# Patient Record
Sex: FEMALE | Race: AMERICAN INDIAN OR ALASKA NATIVE
[De-identification: names, ages, dates, MRNs, and addresses within clinical notes are randomized per-mention and may not be internally consistent; named-entity substitution may affect disease eponyms.]

---

## 2017-02-28 ENCOUNTER — HOSPITAL ENCOUNTER (EMERGENCY)
Dept: HOSPITAL 5 - ED | Age: 47
LOS: 1 days | Discharge: HOME | End: 2017-03-01
Payer: COMMERCIAL

## 2017-02-28 DIAGNOSIS — M54.5: Primary | ICD-10-CM

## 2017-02-28 DIAGNOSIS — Z90.49: ICD-10-CM

## 2017-02-28 DIAGNOSIS — G89.29: ICD-10-CM

## 2017-02-28 DIAGNOSIS — Z90.710: ICD-10-CM

## 2017-02-28 DIAGNOSIS — D64.9: ICD-10-CM

## 2017-02-28 DIAGNOSIS — Z88.8: ICD-10-CM

## 2017-02-28 PROCEDURE — 85025 COMPLETE CBC W/AUTO DIFF WBC: CPT

## 2017-02-28 PROCEDURE — 81001 URINALYSIS AUTO W/SCOPE: CPT

## 2017-02-28 PROCEDURE — 74177 CT ABD & PELVIS W/CONTRAST: CPT

## 2017-02-28 PROCEDURE — 99284 EMERGENCY DEPT VISIT MOD MDM: CPT

## 2017-02-28 PROCEDURE — 83690 ASSAY OF LIPASE: CPT

## 2017-02-28 PROCEDURE — 36415 COLL VENOUS BLD VENIPUNCTURE: CPT

## 2017-02-28 PROCEDURE — 96374 THER/PROPH/DIAG INJ IV PUSH: CPT

## 2017-02-28 PROCEDURE — 80053 COMPREHEN METABOLIC PANEL: CPT

## 2017-03-01 VITALS — SYSTOLIC BLOOD PRESSURE: 101 MMHG | DIASTOLIC BLOOD PRESSURE: 70 MMHG

## 2017-03-01 LAB
ALBUMIN SERPL-MCNC: 4.2 G/DL (ref 3.9–5)
ALBUMIN/GLOB SERPL: 1.2 %
ALP SERPL-CCNC: 91 UNITS/L (ref 35–129)
ALT SERPL-CCNC: 9 UNITS/L (ref 7–56)
ANION GAP SERPL CALC-SCNC: 19 MMOL/L
BACTERIA #/AREA URNS HPF: (no result) /HPF
BASOPHILS NFR BLD AUTO: 0.9 % (ref 0–1.8)
BILIRUB SERPL-MCNC: < 0.2 MG/DL (ref 0.1–1.2)
BILIRUB UR QL STRIP: (no result)
BLOOD UR QL VISUAL: (no result)
BUN SERPL-MCNC: 25 MG/DL (ref 7–17)
BUN/CREAT SERPL: 31.25 %
CALCIUM SERPL-MCNC: 9 MG/DL (ref 8.4–10.2)
CHLORIDE SERPL-SCNC: 98.6 MMOL/L (ref 98–107)
CO2 SERPL-SCNC: 24 MMOL/L (ref 22–30)
EOSINOPHIL NFR BLD AUTO: 2 % (ref 0–4.3)
GLUCOSE SERPL-MCNC: 96 MG/DL (ref 65–100)
HCT VFR BLD CALC: 40.9 % (ref 30.3–42.9)
HGB BLD-MCNC: 13.2 GM/DL (ref 10.1–14.3)
KETONES UR STRIP-MCNC: (no result) MG/DL
LEUKOCYTE ESTERASE UR QL STRIP: (no result)
LIPASE SERPL-CCNC: 33 UNITS/L (ref 13–60)
MCH RBC QN AUTO: 26 PG (ref 28–32)
MCHC RBC AUTO-ENTMCNC: 32 % (ref 30–34)
MCV RBC AUTO: 81 FL (ref 79–97)
MUCOUS THREADS #/AREA URNS HPF: (no result) /HPF
NITRITE UR QL STRIP: (no result)
PH UR STRIP: 5 [PH] (ref 5–7)
PLATELET # BLD: 321 K/MM3 (ref 140–440)
POTASSIUM SERPL-SCNC: 4.2 MMOL/L (ref 3.6–5)
PROT SERPL-MCNC: 7.7 G/DL (ref 6.3–8.2)
PROT UR STRIP-MCNC: (no result) MG/DL
RBC # BLD AUTO: 5.08 M/MM3 (ref 3.65–5.03)
RBC #/AREA URNS HPF: 5 /HPF (ref 0–6)
SODIUM SERPL-SCNC: 137 MMOL/L (ref 137–145)
UROBILINOGEN UR-MCNC: < 2 MG/DL (ref ?–2)
WBC # BLD AUTO: 6.8 K/MM3 (ref 4.5–11)
WBC #/AREA URNS HPF: 16 /HPF (ref 0–6)

## 2017-03-01 NOTE — EMERGENCY DEPARTMENT REPORT
HPI





- General


Chief Complaint: Abdominal Pain


Time Seen by Provider: 03/01/17 10:29





- HPI


HPI: 





 


Chief complaint: Neck and back pain


HPI: Patient is a 46-year-old female who states she's been having neck and low 

back pain off and one for 10 years after slipping and falling at work.  Patient 

states over the last month the back pain has gotten worse in the neck pain as 

well the radiation down her arms and legs.  Patient also complains of right 

lower quadrant abdominal pain over the last 2 weeks.  Patient denies nausea, 

vomiting, diarrhea, fever, cough or cold.  Patient denies vaginal discharge or 

dysuria.  Patient has had her appendix removed.  Patient states she had an MRI 

done but she does not know what it says.


Mode of arrival:   [private car] 


Source: [Patient] 


Began: see above


Duration: See above


Context: See above


Quality: Sharp and tingly


Severity: 10 out of 10


Improved with: Nothing


Worsened with: Palpation


Associated signs and symptoms: See above





ED Past Medical Hx





- Past Medical History


Previous Medical History?: Yes


Additional medical history: anemia





- Surgical History


Past Surgical History?: Yes


Hx Appendectomy: Yes


Additional Surgical History: hysterectomy,appendectomy





- Social History


Smoking Status: Never Smoker


Substance Use Type: None





- Medications


Home Medications: 


 Home Medications











 Medication  Instructions  Recorded  Confirmed  Last Taken  Type


 


Cyclobenzaprine [Flexeril] 10 mg PO TID PRN #15 tablet 08/15/16  Unknown Rx


 


Ibuprofen [Motrin 800 MG tab] 800 mg PO Q8HR PRN #30 tablet 08/15/16  Unknown Rx


 


Ibuprofen [Motrin 600 MG tab] 600 mg PO Q8H PRN #20 tablet 03/01/17  Unknown Rx


 


Metaxalone [Skelaxin] 800 mg PO TID #10 tablet 03/01/17  Unknown Rx


 


traMADol [Ultram 50 MG tab] 50 mg PO Q6HR PRN #14 tablet 03/01/17  Unknown Rx














ED Review of Systems


ROS: 


Stated complaint: RT SIDE/BACK PAIN


Other details as noted in HPI


ROS





Constitutional: No fever 


ENT: No uri symptoms


Cardiovascular: No chest pain


Respiratory: No sob or cough


GI: No nausea vomiting or diarrhea


: No dysuria frequency or urgency, 


Skin: No rash


Neuro: No focal weakness


Psych: No depression


Hema/lymph: No edema





Physical Exam





- Physical Exam


Vital Signs: 


 Vital Signs











  02/28/17 03/01/17 03/01/17





  23:02 08:00 11:19


 


Temperature 98.6 F  


 


Pulse Rate 77 65 


 


Respiratory 18 18 16





Rate   


 


Blood Pressure 116/83  


 


Blood Pressure  97/67 





[Right]   


 


O2 Sat by Pulse 99 100 





Oximetry   











Physical Exam: 





GENERAL: The patient is well-developed well-nourished . 


HEENT: Normocephalic.  Atraumatic.  Extraocular motions are intact.  Patient 

has moist mucous membranes.


NECK: Supple.  No meningitic signs are noted.  There is no adenopathy noted.


CHEST/LUNGS: Clear to auscultation.  There is no respiratory distress noted.


HEART/CARDIOVASCULAR: Regular.  There is no tachycardia.  There is no gallop 

rub or murmur.


ABDOMEN: Abdomen is soft, mild right lower quadrant tenderness without rebound 

or guarding.  Patient has normal bowel sounds.  There is no abdominal 

distention.


SKIN: There is no rash.  There is no edema.  There is no diaphoresis.


NEURO: The patient is awake, alert, and oriented.  The patient is cooperative.  

The patient has no focal neurologic deficits.  The patient has normal speech.


MUSCULOSKELETAL: Patient has tenderness everywhere I touch her from her neck to 

her lower spine and paraspinal musculature as well.  There is no limitation 

range of motion.  There is no evidence of acute injury.  Straight leg raise is 

negative.





ED Course


 Vital Signs











  02/28/17 03/01/17 03/01/17





  23:02 08:00 11:19


 


Temperature 98.6 F  


 


Pulse Rate 77 65 


 


Respiratory 18 18 16





Rate   


 


Blood Pressure 116/83  


 


Blood Pressure  97/67 





[Right]   


 


O2 Sat by Pulse 99 100 





Oximetry   














- Reevaluation(s)


Reevaluation #1: 





03/01/17 


Patient given 30 mg of IV Toradol with improvement of her pain.





ED Medical Decision Making





- Lab Data


Result diagrams: 


 03/01/17 00:45





 03/01/17 00:45





 Laboratory Tests











  03/01/17





  07:10


 


Ur Specific Gravity  1.028


 


Urine Protein  <15 mg/dl


 


Urine Blood  Sm


 


Urine Urobilinogen  < 2.0


 


Ur Leukocyte Esterase  Lg


 


Urine WBC (Auto)  16.0 H


 


Urine RBC (Auto)  5.0


 


U Epithel Cells (Auto)  6.0


 


Urine Bacteria (Auto)  1+














- Radiology Data


Radiology results: report reviewed (T CT scan of the abdomen is within normal 

limits.)


Critical care attestation.: 


If time is entered above; I have spent that time in minutes in the direct care 

of this critically ill patient, excluding procedure time.








ED Disposition


Clinical Impression: 


 Acute exacerbation of chronic low back pain





Disposition: DISCHARGED TO HOME OR SELFCARE


Is pt being admited?: No


Does the pt Need Aspirin: No


Condition: Stable


Instructions:  Chronic Back Pain (ED)


Prescriptions: 


Ibuprofen [Motrin 600 MG tab] 600 mg PO Q8H PRN #20 tablet


 PRN Reason: Pain


Metaxalone [Skelaxin] 800 mg PO TID #10 tablet


traMADol [Ultram 50 MG tab] 50 mg PO Q6HR PRN #14 tablet


 PRN Reason: Pain


Referrals: 


PRIMARY CARE,MD [Primary Care Provider] - as needed (Follow-up with your doctor 

as planned on the 14th.)


Forms:  Work/School Release Form(ED)


Time of Disposition: 12:57

## 2017-03-01 NOTE — CAT SCAN REPORT
CT of the abdomen and pelvis with IV contrast.



Findings: The liver is normal in size and configuration. There is a 

subcentimeter round hypodensity in the lateral aspect of the right lobe 

of the liver seen best on image #83 of series 2. No other focal hepatic 

abnormalities are seen. The spleen, pancreas, gallbladder, and kidneys 

appear normal. There no pelvic masses or abnormal fluid collections. No 

mesenteric inflammation seen. There is no free air. There is no 

radiographic evidence of appendicitis.



Impression:  No acute findings. A subcentimeter hepatic hypodensity is 

most likely benign.

## 2017-12-05 ENCOUNTER — HOSPITAL ENCOUNTER (EMERGENCY)
Dept: HOSPITAL 5 - ED | Age: 47
Discharge: HOME | End: 2017-12-05
Payer: COMMERCIAL

## 2017-12-05 VITALS — SYSTOLIC BLOOD PRESSURE: 101 MMHG | DIASTOLIC BLOOD PRESSURE: 69 MMHG

## 2017-12-05 DIAGNOSIS — D64.9: ICD-10-CM

## 2017-12-05 DIAGNOSIS — G89.29: ICD-10-CM

## 2017-12-05 DIAGNOSIS — Z88.6: ICD-10-CM

## 2017-12-05 DIAGNOSIS — N39.0: ICD-10-CM

## 2017-12-05 DIAGNOSIS — Z90.49: ICD-10-CM

## 2017-12-05 DIAGNOSIS — M54.5: Primary | ICD-10-CM

## 2017-12-05 LAB
BACTERIA #/AREA URNS HPF: (no result) /HPF
BILIRUB UR QL STRIP: (no result)
BLOOD UR QL VISUAL: (no result)
KETONES UR STRIP-MCNC: (no result) MG/DL
LEUKOCYTE ESTERASE UR QL STRIP: (no result)
MUCOUS THREADS #/AREA URNS HPF: (no result) /HPF
NITRITE UR QL STRIP: (no result)
PH UR STRIP: 7 [PH] (ref 5–7)
PROT UR STRIP-MCNC: (no result) MG/DL
RBC #/AREA URNS HPF: 7 /HPF (ref 0–6)
UROBILINOGEN UR-MCNC: 2 MG/DL (ref ?–2)
WBC #/AREA URNS HPF: 42 /HPF (ref 0–6)

## 2017-12-05 PROCEDURE — 81001 URINALYSIS AUTO W/SCOPE: CPT

## 2017-12-05 PROCEDURE — 99284 EMERGENCY DEPT VISIT MOD MDM: CPT

## 2017-12-05 PROCEDURE — 96372 THER/PROPH/DIAG INJ SC/IM: CPT

## 2017-12-05 NOTE — EMERGENCY DEPARTMENT REPORT
ED Back Pain/Injury HPI





- General


Chief Complaint: Extremity Injury, Lower


Stated Complaint: BACK PAIN


Time Seen by Provider: 12/05/17 14:13


Source: patient


Limitations: No Limitations





- History of Present Illness


Initial Comments: 





Patient reports a flare-up with chronic back pain that started three months 

ago. Patient is requesting a refill on Ibuprofen and Flexeril for symptomatic 

support therapy


MD Complaint: back pain


Onset/Timing: 3


-: month(s)


Similar Symptoms Previously: Yes


Place: home


Radiation: other (left trapezius)


Severity: severe


Severity scale (0 -10): 9


Quality: aching


Consistency: constant


Improves With: other (rest)


Worsens With: movement


Context: other (pushing and pulling carts )


Associated Symptoms: denies other symptoms.  denies: confusion, weakness, chest 

pain, numbness, difficulty walking, cough, difficulty urinating, diaphoresis, 

incontinence, fever/chills, constipation, headaches, abdominal pain, loss of 

appetite, malaise, nausea/vomiting, rash, seizure, shortness of breath, syncope


Treatments Prior to Arrival: cold therapy





- Related Data


 Previous Rx's











 Medication  Instructions  Recorded  Last Taken  Type


 


Ibuprofen [Motrin 600 MG tab] 600 mg PO Q8H PRN #20 tablet 03/01/17 Unknown Rx


 


Metaxalone [Skelaxin] 800 mg PO TID #10 tablet 03/01/17 Unknown Rx


 


traMADol [Ultram 50 MG tab] 50 mg PO Q6HR PRN #14 tablet 03/01/17 Unknown Rx


 


Cyclobenzaprine [Flexeril 10 MG 10 mg PO TID PRN #15 tablet 12/05/17 Unknown Rx





TAB]    


 


Ibuprofen [Motrin 800 MG tab] 800 mg PO Q8HR PRN #30 tablet 12/05/17 Unknown Rx


 


Nitrofurantoin Monohyd/M-Cryst 100 mg PO BID #14 capsule 12/05/17 Unknown Rx





[Macrobid 100 mg Capsule]    











 Allergies











Allergy/AdvReac Type Severity Reaction Status Date / Time


 


diphenhydramine HCl Allergy  Unknown Verified 08/15/16 18:53





[From Benadryl]     


 


oxycodone HCl [From Percocet] AdvReac  Unknown Verified 08/15/16 18:53














ED Review of Systems


ROS: 


Stated complaint: BACK PAIN


Other details as noted in HPI





Constitutional: denies: chills, diaphoresis, fever, malaise, weakness


Eyes: denies: eye pain, eye discharge, vision change


ENT: denies: ear pain, throat pain, dental pain, hearing loss, epistaxis


Respiratory: denies: cough, orthopnea, shortness of breath, SOB with exertion, 

SOB at rest, stridor


Cardiovascular: denies: chest pain, palpitations, dyspnea on exertion, orthopnea

, edema, syncope, paroxysmal nocturnal dyspnea


Gastrointestinal: denies: abdominal pain, nausea, vomiting, diarrhea, 

constipation, hematemesis


Genitourinary: denies: urgency, dysuria, frequency, hematuria, discharge, 

abnormal menses, dyspareunia


Musculoskeletal: back pain.  denies: joint swelling, arthralgia, myalgia


Skin: denies: rash, lesions, change in color, change in hair/nails, pruritus


Neurological: denies: headache, weakness, numbness, paresthesias, confusion


Psychiatric: denies: anxiety, depression


Hematological/Lymphatic: denies: easy bleeding, easy bruising, swollen glands





ED Past Medical Hx





- Past Medical History


Previous Medical History?: Yes


Additional medical history: anemia





- Surgical History


Past Surgical History?: Yes


Hx Appendectomy: Yes


Additional Surgical History: hysterectomy,appendectomy





- Social History


Smoking Status: Never Smoker


Substance Use Type: None





- Medications


Home Medications: 


 Home Medications











 Medication  Instructions  Recorded  Confirmed  Last Taken  Type


 


Ibuprofen [Motrin 600 MG tab] 600 mg PO Q8H PRN #20 tablet 03/01/17  Unknown Rx


 


Metaxalone [Skelaxin] 800 mg PO TID #10 tablet 03/01/17  Unknown Rx


 


traMADol [Ultram 50 MG tab] 50 mg PO Q6HR PRN #14 tablet 03/01/17  Unknown Rx


 


Cyclobenzaprine [Flexeril 10 MG 10 mg PO TID PRN #15 tablet 12/05/17  Unknown Rx





TAB]     


 


Ibuprofen [Motrin 800 MG tab] 800 mg PO Q8HR PRN #30 tablet 12/05/17  Unknown Rx


 


Nitrofurantoin Monohyd/M-Cryst 100 mg PO BID #14 capsule 12/05/17  Unknown Rx





[Macrobid 100 mg Capsule]     














ED Physical Exam





- General


Limitations: No Limitations


General appearance: alert, in no apparent distress





- Head


Head exam: Present: atraumatic, normocephalic, normal inspection





- ENT


ENT exam: Present: normal exam, normal orophraynx, mucous membranes moist.  

Absent: mucous membranes dry





- Neck


Neck exam: Present: normal inspection, full ROM.  Absent: tenderness, 

meningismus, lymphadenopathy, thyromegaly





- Respiratory


Respiratory exam: Present: normal lung sounds bilaterally.  Absent: respiratory 

distress, wheezes, rales, rhonchi, stridor, chest wall tenderness, accessory 

muscle use, decreased breath sounds, prolonged expiratory





- Cardiovascular


Cardiovascular Exam: Present: regular rate, normal rhythm, normal heart sounds.

  Absent: bradycardia, tachycardia, systolic murmur, diastolic murmur, rubs, 

gallop





- GI/Abdominal


GI/Abdominal exam: Present: soft, normal bowel sounds.  Absent: distended, 

tenderness, guarding, rebound, rigid





- Extremities Exam


Extremities exam: Present: normal inspection, full ROM, normal capillary 

refill.  Absent: tenderness, pedal edema, joint swelling, calf tenderness





- Back Exam


Back exam: Present: normal inspection, full ROM, tenderness (right lassimus 

dorsi).  Absent: CVA tenderness (R), muscle spasm, paraspinal tenderness, 

vertebral tenderness, rash noted





- Neurological Exam


Neurological exam: Present: alert, oriented X3, CN II-XII intact, normal gait, 

reflexes normal.  Absent: motor sensory deficit





- Psychiatric


Psychiatric exam: Present: normal affect, normal mood.  Absent: depressed, 

agitated





- Skin


Skin exam: Present: warm, dry, intact, normal color.  Absent: rash





ED Course


 Vital Signs











  12/05/17





  10:59


 


Temperature 98.6 F


 


Pulse Rate 87


 


Respiratory 18





Rate 


 


Blood Pressure 101/69


 


O2 Sat by Pulse 98





Oximetry 














- Reevaluation(s)


Reevaluation #1: 





12/05/17 14:16


NSAIDS ordered





ED Medical Decision Making





- Lab Data








 Lab Results











  12/05/17 Range/Units





  11:03 


 


Urine Color  Yellow  (Yellow)  


 


Urine Turbidity  Hazy  (Clear)  


 


Urine pH  7.0  (5.0-7.0)  


 


Ur Specific Gravity  1.019  (1.003-1.030)  


 


Urine Protein  <15 mg/dl  (Negative)  mg/dL


 


Urine Glucose (UA)  Neg  (Negative)  mg/dL


 


Urine Ketones  Neg  (Negative)  mg/dL


 


Urine Blood  Neg  (Negative)  


 


Urine Nitrite  Neg  (Negative)  


 


Ur Reducing Substances  Not Reportable  


 


Urine Bilirubin  Neg  (Negative)  


 


Urine Ictotest  Not Reportable  


 


Urine Urobilinogen  2.0  (<2.0)  mg/dL


 


Ur Leukocyte Esterase  Lg  (Negative)  


 


Urine WBC (Auto)  42.0 H  (0.0-6.0)  /HPF


 


Urine RBC (Auto)  7.0  (0.0-6.0)  /HPF


 


U Epithel Cells (Auto)  2.0  (0-13.0)  /HPF


 


Urine Bacteria (Auto)  1+  (Negative)  /HPF


 


Urine Mucus  Few  /HPF














 Vital Signs











  12/05/17





  10:59


 


Temperature 98.6 F


 


Pulse Rate 87


 


Respiratory 18





Rate 


 


Blood Pressure 101/69


 


O2 Sat by Pulse 98





Oximetry 














- Medical Decision Making





During the course of ED, laboratory studies revealed an elevated WBCs' in the 

urine. Patient was sent home with prescriptions for Macrobid, Ibuprofen and 

Flexeril to manage the UTI and chronic back pain, she verbalized understanding





- Differential Diagnosis


UTI, Chronic Back Pain


Critical care attestation.: 


If time is entered above; I have spent that time in minutes in the direct care 

of this critically ill patient, excluding procedure time.








ED Disposition


Clinical Impression: 


Urinary tract infection


Qualifiers:


 Urinary tract infection type: site unspecified Hematuria presence: without 

hematuria Qualified Code(s): N39.0 - Urinary tract infection, site not specified





Chronic back pain


Qualifiers:


 Back pain location: low back pain Back pain laterality: midline Sciatica 

presence: without sciatica Qualified Code(s): M54.5 - Low back pain





Disposition: DC-01 TO HOME OR SELFCARE


Is pt being admited?: No


Does the pt Need Aspirin: No


Condition: Stable


Instructions:  Urinary Tract Infection in Women (ED), Chronic Back Pain (ED)


Additional Instructions: 


Take medication as ordered. Follow up with your PCP next week


Prescriptions: 


Cyclobenzaprine [Flexeril 10 MG TAB] 10 mg PO TID PRN #15 tablet


 PRN Reason: Muscle Spasm


Ibuprofen [Motrin 800 MG tab] 800 mg PO Q8HR PRN #30 tablet


 PRN Reason: Inflammation


Nitrofurantoin Monohyd/M-Cryst [Macrobid 100 mg Capsule] 100 mg PO BID #14 

capsule


Referrals: 


PRIMARY CARE,MD [Primary Care Provider] - 3-5 Days


Forms:  Work/School Release Form(ED)


Time of Disposition: 14:20

## 2018-10-17 ENCOUNTER — HOSPITAL ENCOUNTER (EMERGENCY)
Dept: HOSPITAL 5 - ED | Age: 48
Discharge: HOME | End: 2018-10-17
Payer: SELF-PAY

## 2018-10-17 VITALS — SYSTOLIC BLOOD PRESSURE: 150 MMHG | DIASTOLIC BLOOD PRESSURE: 95 MMHG

## 2018-10-17 DIAGNOSIS — S29.012A: ICD-10-CM

## 2018-10-17 DIAGNOSIS — Z88.8: ICD-10-CM

## 2018-10-17 DIAGNOSIS — Z90.49: ICD-10-CM

## 2018-10-17 DIAGNOSIS — Z90.710: ICD-10-CM
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
## 2018-10-17 DIAGNOSIS — Z88.6: ICD-10-CM
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
## 2018-10-17 DIAGNOSIS — S00.12XA: Primary | ICD-10-CM

## 2018-10-17 DIAGNOSIS — Z91.018: ICD-10-CM
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
## 2018-10-17 PROCEDURE — 99284 EMERGENCY DEPT VISIT MOD MDM: CPT

## 2018-10-17 PROCEDURE — 72040 X-RAY EXAM NECK SPINE 2-3 VW: CPT

## 2018-10-17 PROCEDURE — 70486 CT MAXILLOFACIAL W/O DYE: CPT

## 2018-10-17 NOTE — CAT SCAN REPORT
FINAL REPORT



EXAM:  CT FACIAL BONES WO CON



HISTORY:  swelling and pain to left eye 



TECHNIQUE:  Axial helical imaging through the face with sagittal

and coronal reformatted images obtained.



Comparison: None 



FINDINGS:  

The orbital contents are unremarkable in appearance.



The paranasal sinuses are without mucosal thickening or air-fluid

levels.



The nasal septum is midline.



There is no evidence of fracture.



There is no evidence of an inflammatory process in the deep soft

tissues of the face on this study without contrast..



IMPRESSION:  

1. No evidence of abnormality involving the orbits, paranasal

sinuses or bony structures of the face.

## 2018-10-17 NOTE — XRAY REPORT
FINAL REPORT



EXAM:  XR SPINE CERVICAL 2-3V



HISTORY:  bilateral trapezius tenderness 



TECHNIQUE:  Frontal, lateral, swimmer's and odontoid views

cervical spine



Comparison: None 



FINDINGS:  

Bony alignment is normal.



The vertebral heights and disc spaces are maintained.



There is no evidence of fracture or subluxation.



The paraspinous soft tissues are unremarkable.



IMPRESSION:  

1. No evidence of fracture or subluxation.



Subtle cervical spine fractures can be missed with plain film

imaging. If there is a persistent clinical concern for fracture,

CT imaging would be helpful.
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
## 2022-02-21 NOTE — EMERGENCY DEPARTMENT REPORT
HPI





- General


Chief Complaint: Weakness


Time Seen by Provider: 02/21/22 04:08





- HPI


HPI: 





Room 19








The patient is a 51-year-old female present with a chief complaint of dizziness 

and palpitations.  The patient states she donated blood earlier in the day and 

afterwards while walking she began to feel dizzy and had palpitations.  Patient 

states at some point she had substernal chest pain that lasted 30 minutes and 

then resolved.  The patient states she still has intermittent dizziness.  

Patient states she feels as though she is having palpitations currently but has 

a normal heart rate on exam (84-95 bpm)





ED Past Medical Hx





- Past Medical History


Previous Medical History?: Yes


Hx Hypertension: Yes


Additional medical history: anemia





- Surgical History


Past Surgical History?: Yes


Hx Appendectomy: Yes


Additional Surgical History: hysterectomy,appendectomy





- Family History


Family history: no significant





- Social History


Smoking Status: Never Smoker


Substance Use Type: None (Denies illicit drug use)





- Medications


Home Medications: 


                                Home Medications











 Medication  Instructions  Recorded  Confirmed  Last Taken  Type


 


Ibuprofen [Motrin 600 MG tab] 600 mg PO Q8H PRN #20 tablet 03/01/17  Unknown Rx


 


Metaxalone [Skelaxin] 800 mg PO TID #10 tablet 03/01/17  Unknown Rx


 


traMADoL [Ultram 50 MG tab] 50 mg PO Q6HR PRN #14 tablet 03/01/17  Unknown Rx


 


Cyclobenzaprine [Flexeril 10 MG 10 mg PO TID PRN #15 tablet 12/05/17  Unknown Rx





TAB]     


 


Ibuprofen [Motrin 800 MG tab] 800 mg PO Q8HR PRN #30 tablet 12/05/17  Unknown Rx


 


Nitrofurantoin Monohyd/M-Cryst 100 mg PO BID #14 capsule 12/05/17  Unknown Rx





[Macrobid 100 mg Capsule]     


 


Ibuprofen [Motrin 600 MG tab] 600 mg PO Q8H PRN #12 tablet 10/17/18  Unknown Rx














ED Review of Systems


ROS: 


Stated complaint: WEAKNESS


Other details as noted in HPI





Constitutional: no symptoms reported


Eyes: denies: eye pain


ENT: denies: throat pain


Respiratory: no symptoms reported


Cardiovascular: chest pain, palpitations


Endocrine: no symptoms reported


Gastrointestinal: denies: abdominal pain


Genitourinary: denies: dysuria


Musculoskeletal: denies: back pain


Neurological: headache, vertigo





Physical Exam





- Physical Exam


Vital Signs: 


                                   Vital Signs











  02/20/22 02/21/22





  21:57 04:22


 


Temperature 98.4 F 98.3 F


 


Pulse Rate 74 88


 


Respiratory 18 18





Rate  


 


Blood Pressure  110/78


 


Blood Pressure 120/72 





[Left]  


 


O2 Sat by Pulse 98 100





Oximetry  








                                   Vital Signs











  02/20/22 02/21/22 02/21/22





  21:57 04:22 06:57


 


Temperature 98.4 F 98.3 F 


 


Pulse Rate 74 88 72


 


Pulse Rate [   





Lying]   


 


Pulse Rate [   





Sitting]   


 


Pulse Rate [   





Standing]   


 


Respiratory 18 18 9 L





Rate   


 


Blood Pressure  110/78 


 


Blood Pressure 120/72  





[Left]   


 


Blood Pressure   





[Lying]   


 


Blood Pressure   





[Sitting]   


 


Blood Pressure   





[Standing]   


 


O2 Sat by Pulse 98 100 





Oximetry   














  02/21/22 02/21/22 02/21/22





  07:00 07:01 07:16


 


Temperature   


 


Pulse Rate 94 H  78


 


Pulse Rate [  74 





Lying]   


 


Pulse Rate [  79 





Sitting]   


 


Pulse Rate [  96 H 





Standing]   


 


Respiratory 11 L  18





Rate   


 


Blood Pressure 117/79  117/79


 


Blood Pressure   





[Left]   


 


Blood Pressure  118/80 





[Lying]   


 


Blood Pressure  123/74 





[Sitting]   


 


Blood Pressure  117/79 





[Standing]   


 


O2 Sat by Pulse   





Oximetry   














  02/21/22 02/21/22





  07:30 07:46


 


Temperature  


 


Pulse Rate 68 76


 


Pulse Rate [  





Lying]  


 


Pulse Rate [  





Sitting]  


 


Pulse Rate [  





Standing]  


 


Respiratory 15 16





Rate  


 


Blood Pressure 117/79 104/79


 


Blood Pressure  





[Left]  


 


Blood Pressure  





[Lying]  


 


Blood Pressure  





[Sitting]  


 


Blood Pressure  





[Standing]  


 


O2 Sat by Pulse  





Oximetry  











Physical Exam: 





GENERAL: The patient is well-developed well-nourished female lying on stretcher 

not appearing to be in acute distress. []


HEENT: Normocephalic.  Atraumatic.  Extraocular motions are intact.  Patient has

 moist mucous membranes.  No nystagmus noted


NECK: Supple.  No meningitic signs are noted.  Trachea midline


CHEST/LUNGS: Clear to auscultation.  There is no respiratory distress noted.


HEART/CARDIOVASCULAR: Regular.  There is no tachycardia.  There is no gallop rub

 or murmur.


ABDOMEN: Abdomen is soft, nontender.  Patient has normal bowel sounds.  There is

 no abdominal distention.


SKIN: There is no rash.  There is no edema.  There is no diaphoresis.


NEURO: The patient is awake, alert, and oriented.  The patient is cooperative.  

The patient has no focal neurologic deficits.  The patient has normal speech and

 gait.  Cranial nerves II through XII grossly intact


MUSCULOSKELETAL: T There is no evidence of acute injury.





ED Course


                                   Vital Signs











  02/20/22 02/21/22





  21:57 04:22


 


Temperature 98.4 F 98.3 F


 


Pulse Rate 74 88


 


Respiratory 18 18





Rate  


 


Blood Pressure  110/78


 


Blood Pressure 120/72 





[Left]  


 


O2 Sat by Pulse 98 100





Oximetry  














- Reevaluation(s)


Reevaluation #1: 





02/21/22 09:58


Patient states she feels improved





ED Medical Decision Making





- Lab Data


Result diagrams: 


                                 02/21/22 04:38





                                 02/21/22 04:38





- EKG Data


-: EKG Interpreted by Me


EKG shows normal: sinus rhythm, axis


Rate: normal (74 beats per)





- EKG Data


When compared to previous EKG there are: previous EKG unavailable


Interpretation: other (No ischemic changes seen)





- Differential Diagnosis


Hypovolemia, dysrhythmia, symptomatic anemia, ACS,


Critical care attestation.: 


If time is entered above; I have spent that time in minutes in the direct care 

of this critically ill patient, excluding procedure time.








ED Disposition


Clinical Impression: 


 Hypovolemia





Disposition: 01 HOME / SELF CARE / HOMELESS


Is pt being admited?: No


Does the pt Need Aspirin: No


Condition: Stable


Instructions:  Rehydration, Adult


Additional Instructions: 


Return to the emergency department should you develop worsening symptoms, 

inability to tolerate food or liquids, high fever or any other concerns


Referrals: 


ALVINA MEDRANO MD [Primary Care Provider] - 3-5 Days


Time of Disposition: 09:58

## 2022-02-22 NOTE — ELECTROCARDIOGRAPH REPORT
[facility]

                                       

Test Date:    2022               Test Time:    06:52:30

Pat Name:     ANGE SPENCER           Department:   

Patient ID:   [id number]          Room:          

Gender:       F                        Technician:   AVERY

:          1970               Requested By: GASTON NGUYỄN

Order Number: [id number]              Reading MD:   Marcos Ricci

                                 Measurements

Intervals                              Axis          

Rate:         74                       P:            21

DE:           148                      QRS:          77

QRSD:         73                       T:            11

QT:           388                                    

QTc:          431                                    

                           Interpretive Statements

Sinus rhythm

No previous ECG available for comparison

Electronically Signed On 2022 10:12:14 EST by Marcos Ricci